# Patient Record
Sex: MALE | Race: BLACK OR AFRICAN AMERICAN | Employment: FULL TIME | URBAN - METROPOLITAN AREA
[De-identification: names, ages, dates, MRNs, and addresses within clinical notes are randomized per-mention and may not be internally consistent; named-entity substitution may affect disease eponyms.]

---

## 2021-04-06 ENCOUNTER — HOSPITAL ENCOUNTER (EMERGENCY)
Age: 56
Discharge: HOME | End: 2021-04-06

## 2021-04-06 VITALS
HEART RATE: 110 BPM | RESPIRATION RATE: 16 BRPM | OXYGEN SATURATION: 95 % | WEIGHT: 192 LBS | TEMPERATURE: 98.5 F | DIASTOLIC BLOOD PRESSURE: 100 MMHG | SYSTOLIC BLOOD PRESSURE: 145 MMHG
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
## 2021-04-06 DIAGNOSIS — S60.561A INSECT BITE OF RIGHT HAND, INITIAL ENCOUNTER: Primary | ICD-10-CM

## 2021-04-06 DIAGNOSIS — L03.113 CELLULITIS OF RIGHT HAND: ICD-10-CM

## 2021-04-06 PROCEDURE — 99282 EMERGENCY DEPT VISIT SF MDM: CPT

## 2021-04-06 RX ORDER — SULFAMETHOXAZOLE AND TRIMETHOPRIM 800; 160 MG/1; MG/1
1 TABLET ORAL 2 TIMES DAILY
Qty: 20 TAB | Refills: 0 | Status: SHIPPED | OUTPATIENT
Start: 2021-04-06 | End: 2021-04-16

## 2021-04-06 RX ORDER — CEPHALEXIN 500 MG/1
500 CAPSULE ORAL 4 TIMES DAILY
Qty: 40 CAP | Refills: 0 | Status: SHIPPED | OUTPATIENT
Start: 2021-04-06 | End: 2021-04-16

## 2021-04-06 NOTE — ED PROVIDER NOTES
[facility] 78 COMPLAINT   Chief Complaint   Patient presents with    Insect Bite         HISTORY OF PRESENT ILLNESS    54 y.o. male without any medical history presents for evaluation with concerns of spider bite to right hand. Patient reports he woke up yesterday morning with area of redness, pain to back of right hand. Patient admits to spider bite in the past, states it is very similar to that. Pain described as constant, throbbing, rated 4/10. Otherwise denies any decreased range of motion of hand, fingers. Denies numbness/tingling. Denies fever/chills, chest pain, shortness of breath. Denies self-medicating. No further complaints, concerns offered. History provided by patient. PAST MEDICAL HISTORY  No past medical history on file. PAST SURGICAL HISTORY  No past surgical history on file. FAMILY HISTORY  No family history on file. SOCIAL HISTORY  Social History     Socioeconomic History    Marital status: SINGLE     Spouse name: Not on file    Number of children: Not on file    Years of education: Not on file    Highest education level: Not on file         ALLERGIES  Allergies   Allergen Reactions    Bee Venom Protein (Honey Bee) Anaphylaxis         ----------------------------------------------------------------------------------------------------------------------    REVIEW OF SYSTEMS (ROS):    General: No fever, weight loss  Skin: Insect bite  Eyes: No vision problems, pain  ENT: No sore throat, ear pain  Neck: No pain or stiffness  Respiratory: No shortness of breath, cough  Cardiac: No chest pain, palpitations  Gastrointestinal: No nausea, vomiting, or abdominal pain  Genitourinary: No dysuria  Musculoskeletal: No myalgias/arthralgias  Neurologic: No headache or dizziness    All other systems reviewed and are negative, unless otherwise stated in HPI.       PHYSICAL EXAM  Visit Vitals  BP (!) 145/100 (BP 1 Location: Right upper arm, BP Patient Position: At rest)   Pulse (!) 110   Temp 98.5 °F (36.9 °C)   Resp 16   Wt 87.1 kg (192 lb)   SpO2 95%     Pulse ox shows SpO2 of 95%, which is interpreted as normal.    General Appearance: No acute distress, appears comfortable  Skin: 1.5 x 1 cm abscess to dorsum of right hand overlying second MCP joint. Indurated in nature, without fluctuance or active drainage. Full range of motion noted to right second digit without limitation. No Knavel signs  HEENT: Normocephalic/atraumatic, sclera anicteric, mucous membranes moist  Neck: Normal range of motion. No meningeal signs present  Chest and Lungs: Bilateral breath sounds, clear to auscultation. No wheezing, rhonchi, or rales  Cardiovascular: Regular rate and rhythm, no murmur. 2+ distal pulses noted bilaterally  Abdomen: Soft, nontender. No rigidity, guarding, rebound. Bowel sounds normoactive x4  Musculoskeletal: No edema or tenderness  Neurologic: Awake, alert, no obvious deficits. Moving all extremities freely. Sensation intact  Psychiatric: Appropriate, cooperative    Nursing/triage note and vital signs reviewed. Vitals:    04/06/21 1042   BP: (!) 145/100   Pulse: (!) 110   Resp: 16   Temp: 98.5 °F (36.9 °C)   SpO2: 95%   Weight: 87.1 kg (192 lb)     ----------------------------------------------------------------------------------------------------------------------  ED COURSE    Medical Decision Making (MDM):  54 y.o. male presents for evaluation of insect bite, abscess x1 day. Arrives mildly tachycardic, afebrile. Physical exam reveals small abscess to dorsum of right hand, without fluctuance or active drainage. No gross motor or sensory deficit. Initial ED Plan of Care:  Antibiotic      Differential Diagnosis (including but not limited to):  Spider bite  Cellulitis  Abscess  Ganglion cyst    -------------------------------------------------------------------------------------------------------------------    MDM con't:    High suspicion for cellulitis/MRSA infection secondary to spider bite. Given absence of fluctuance and area of abscess, did not proceed with I&D at this time. Will discharge with 10-day course of Keflex, Bactrim. Advise close follow-up with PCP in 2 days for wound recheck. Return precautions were discussed. ----------------------------------------------------------------------------------------------------------------------    Preliminary Clinical Impression  1. Insect bite of right hand, initial encounter    2. Cellulitis of right hand          Plan  -Follow up with PCP in 2-3 days for wound re-check  -Keflex qid, Bactrim bid x10 days  -Patient instructed and strongly encouraged to return immediately to the ED Department for persistent, recurrent or worsening symptoms (Worsening pain, swelling, red streaking, fever/chills, or any other concerns symptoms). I have discussed the ED workup, working diagnosis, and plan of care with the patient. Instructions to notify the Primary Care Provider and arrange additional follow-up were advised. Written aftercare and follow-up instructions were discussed with and verbally acknowledged by the patient. The patient was given the opportunity to ask questions, and any concerns raised were addressed accordingly. I attest that I have seen and treated this patient while Dr. Michael Velasco was the supervising physician in the Emergency Department. Parts of this note were written with the assistance of dictation software.

## 2021-04-06 NOTE — ED NOTES
I have reviewed discharge instructions with the patient. The patient verbalized understanding. Patient left ED via Discharge Method: ambulatory to Home with self transport. The patient is ambulatory upon exit and appears in no acute distress. The patient has been provided discharge instructions, prescriptions x2, and follow up information. Opportunity for questions and clarification provided. Patient given 2 scripts. To continue your aftercare when you leave the hospital, you may receive an automated call from our care team to check in on how you are doing. This is a free service and part of our promise to provide the best care and service to meet your aftercare needs.  If you have questions, or wish to unsubscribe from this service please call [phone]. Thank you for Choosing our [facility] Emergency Department.

## 2021-04-06 NOTE — DISCHARGE INSTRUCTIONS
-Take Keflex 4 times a day for 10 days, Bactrim 2 times a day for 10 days. These medicines will help your infection.  -Soak your hand in warm water every few hours.   If area starts to drain, you may express pus from region  -Return to ED if pain or swelling worsens for incision and drainage

## 2021-04-06 NOTE — ED TRIAGE NOTES
Patient ambulated into triage without difficulty. Patient reports insect bite on right hand index finger knuckle. Noted slight swelling to the sight. Denies SOB.